# Patient Record
Sex: FEMALE | URBAN - METROPOLITAN AREA
[De-identification: names, ages, dates, MRNs, and addresses within clinical notes are randomized per-mention and may not be internally consistent; named-entity substitution may affect disease eponyms.]

---

## 2022-06-15 VITALS
OXYGEN SATURATION: 100 % | SYSTOLIC BLOOD PRESSURE: 154 MMHG | WEIGHT: 221.12 LBS | RESPIRATION RATE: 18 BRPM | HEART RATE: 91 BPM | HEIGHT: 64 IN | DIASTOLIC BLOOD PRESSURE: 87 MMHG | TEMPERATURE: 97 F

## 2022-06-15 RX ORDER — HYOSCYAMINE SULFATE 0.13 MG
0 TABLET ORAL
Qty: 0 | Refills: 0 | DISCHARGE

## 2022-06-15 NOTE — PRE-OP CHECKLIST - ALLERGIES REVIEWED
PT reports having effects from a pesticide for bed bugs. PT reports being around the pesticide for one month. PT c/o headache, eyes swelling with watering, nasal congestion. PT has been on a steroid for 9 days of an 18 day course.  PT reports improvement in done

## 2022-06-15 NOTE — PATIENT PROFILE ADULT - FALL HARM RISK - HARM RISK INTERVENTIONS

## 2022-06-15 NOTE — PRE-OP CHECKLIST - TEMPERATURE IN FAHRENHEIT (DEGREES F)
Problem: Anemia (Adult)  Goal: Identify Related Risk Factors and Signs and Symptoms  Related risk factors and signs and symptoms are identified upon initiation of Human Response Clinical Practice Guideline (CPG)  Outcome: Ongoing (interventions implemented as appropriate)  Plan of care reviewed with patient. Verbalized understanding. No distress noted. 2 units RBC transfused during shift. Will continue to monitor. On telemetry, no red alarms or ectopy AFIB, HR 70s-90s, will continue to monitor.Bed alarm active, bed in lowest position, call light within reach. Patient instructed to use call light for assistance. Verbalized understanding. Bed rails up x2.       96.9

## 2022-06-16 ENCOUNTER — INPATIENT (INPATIENT)
Facility: HOSPITAL | Age: 67
LOS: 1 days | Discharge: HOME | DRG: 621 | End: 2022-06-18
Attending: SURGERY | Admitting: SURGERY
Payer: MEDICARE

## 2022-06-16 DIAGNOSIS — Z98.890 OTHER SPECIFIED POSTPROCEDURAL STATES: Chronic | ICD-10-CM

## 2022-06-16 LAB
BLD GP AB SCN SERPL QL: NEGATIVE — SIGNIFICANT CHANGE UP
HCT VFR BLD CALC: 40.6 % — SIGNIFICANT CHANGE UP (ref 34.5–45)
HGB BLD-MCNC: 13.1 G/DL — SIGNIFICANT CHANGE UP (ref 11.5–15.5)
MCHC RBC-ENTMCNC: 27.5 PG — SIGNIFICANT CHANGE UP (ref 27–34)
MCHC RBC-ENTMCNC: 32.3 GM/DL — SIGNIFICANT CHANGE UP (ref 32–36)
MCV RBC AUTO: 85.3 FL — SIGNIFICANT CHANGE UP (ref 80–100)
NRBC # BLD: 0 /100 WBCS — SIGNIFICANT CHANGE UP (ref 0–0)
PLATELET # BLD AUTO: 205 K/UL — SIGNIFICANT CHANGE UP (ref 150–400)
RBC # BLD: 4.76 M/UL — SIGNIFICANT CHANGE UP (ref 3.8–5.2)
RBC # FLD: 12.8 % — SIGNIFICANT CHANGE UP (ref 10.3–14.5)
RH IG SCN BLD-IMP: POSITIVE — SIGNIFICANT CHANGE UP
WBC # BLD: 14.39 K/UL — HIGH (ref 3.8–10.5)
WBC # FLD AUTO: 14.39 K/UL — HIGH (ref 3.8–10.5)

## 2022-06-16 PROCEDURE — 88307 TISSUE EXAM BY PATHOLOGIST: CPT | Mod: 26

## 2022-06-16 DEVICE — CLIP APPLIER ETHICON LIGAMAX 5MM: Type: IMPLANTABLE DEVICE | Status: FUNCTIONAL

## 2022-06-16 DEVICE — TISSEEL 4ML: Type: IMPLANTABLE DEVICE | Status: FUNCTIONAL

## 2022-06-16 DEVICE — STAPLE SEAMGUARD 60 UNI STRT ROTIC GRN: Type: IMPLANTABLE DEVICE | Status: FUNCTIONAL

## 2022-06-16 DEVICE — STAPLER COVIDIEN TRI-STAPLE 60MM BLACK RELOAD: Type: IMPLANTABLE DEVICE | Status: FUNCTIONAL

## 2022-06-16 RX ORDER — ATORVASTATIN CALCIUM 80 MG/1
20 TABLET, FILM COATED ORAL AT BEDTIME
Refills: 0 | Status: DISCONTINUED | OUTPATIENT
Start: 2022-06-16 | End: 2022-06-18

## 2022-06-16 RX ORDER — SCOPALAMINE 1 MG/3D
1 PATCH, EXTENDED RELEASE TRANSDERMAL ONCE
Refills: 0 | Status: COMPLETED | OUTPATIENT
Start: 2022-06-16 | End: 2022-06-16

## 2022-06-16 RX ORDER — ALBUTEROL 90 UG/1
2 AEROSOL, METERED ORAL EVERY 6 HOURS
Refills: 0 | Status: DISCONTINUED | OUTPATIENT
Start: 2022-06-16 | End: 2022-06-18

## 2022-06-16 RX ORDER — HYDRALAZINE HCL 50 MG
10 TABLET ORAL ONCE
Refills: 0 | Status: COMPLETED | OUTPATIENT
Start: 2022-06-16 | End: 2022-06-16

## 2022-06-16 RX ORDER — VERAPAMIL HCL 240 MG
1 CAPSULE, EXTENDED RELEASE PELLETS 24 HR ORAL
Qty: 0 | Refills: 0 | DISCHARGE

## 2022-06-16 RX ORDER — ATORVASTATIN CALCIUM 80 MG/1
1 TABLET, FILM COATED ORAL
Qty: 0 | Refills: 0 | DISCHARGE

## 2022-06-16 RX ORDER — SODIUM CHLORIDE 9 MG/ML
1000 INJECTION, SOLUTION INTRAVENOUS
Refills: 0 | Status: DISCONTINUED | OUTPATIENT
Start: 2022-06-16 | End: 2022-06-18

## 2022-06-16 RX ORDER — ENOXAPARIN SODIUM 100 MG/ML
40 INJECTION SUBCUTANEOUS ONCE
Refills: 0 | Status: COMPLETED | OUTPATIENT
Start: 2022-06-16 | End: 2022-06-16

## 2022-06-16 RX ORDER — ACETAMINOPHEN 500 MG
650 TABLET ORAL EVERY 6 HOURS
Refills: 0 | Status: DISCONTINUED | OUTPATIENT
Start: 2022-06-16 | End: 2022-06-18

## 2022-06-16 RX ORDER — PANTOPRAZOLE SODIUM 20 MG/1
40 TABLET, DELAYED RELEASE ORAL DAILY
Refills: 0 | Status: DISCONTINUED | OUTPATIENT
Start: 2022-06-16 | End: 2022-06-18

## 2022-06-16 RX ORDER — ONDANSETRON 8 MG/1
4 TABLET, FILM COATED ORAL EVERY 6 HOURS
Refills: 0 | Status: DISCONTINUED | OUTPATIENT
Start: 2022-06-16 | End: 2022-06-18

## 2022-06-16 RX ORDER — ACETAMINOPHEN 500 MG
1000 TABLET ORAL ONCE
Refills: 0 | Status: COMPLETED | OUTPATIENT
Start: 2022-06-16 | End: 2022-06-16

## 2022-06-16 RX ORDER — LOPERAMIDE HCL 2 MG
1 TABLET ORAL
Qty: 0 | Refills: 0 | DISCHARGE

## 2022-06-16 RX ORDER — HYOSCYAMINE SULFATE 0.13 MG
0.12 TABLET ORAL EVERY 6 HOURS
Refills: 0 | Status: DISCONTINUED | OUTPATIENT
Start: 2022-06-16 | End: 2022-06-18

## 2022-06-16 RX ORDER — BENRALIZUMAB 30 MG/ML
0 INJECTION, SOLUTION SUBCUTANEOUS
Qty: 0 | Refills: 0 | DISCHARGE

## 2022-06-16 RX ORDER — KETOROLAC TROMETHAMINE 30 MG/ML
15 SYRINGE (ML) INJECTION ONCE
Refills: 0 | Status: DISCONTINUED | OUTPATIENT
Start: 2022-06-16 | End: 2022-06-16

## 2022-06-16 RX ORDER — ALBUTEROL 90 UG/1
2 AEROSOL, METERED ORAL
Qty: 0 | Refills: 0 | DISCHARGE

## 2022-06-16 RX ADMIN — ENOXAPARIN SODIUM 40 MILLIGRAM(S): 100 INJECTION SUBCUTANEOUS at 08:49

## 2022-06-16 RX ADMIN — Medication 1000 MILLIGRAM(S): at 16:58

## 2022-06-16 RX ADMIN — Medication 650 MILLIGRAM(S): at 23:05

## 2022-06-16 RX ADMIN — SCOPALAMINE 1 PATCH: 1 PATCH, EXTENDED RELEASE TRANSDERMAL at 19:57

## 2022-06-16 RX ADMIN — Medication 15 MILLIGRAM(S): at 21:20

## 2022-06-16 RX ADMIN — SCOPALAMINE 1 PATCH: 1 PATCH, EXTENDED RELEASE TRANSDERMAL at 08:50

## 2022-06-16 RX ADMIN — ATORVASTATIN CALCIUM 20 MILLIGRAM(S): 80 TABLET, FILM COATED ORAL at 23:15

## 2022-06-16 RX ADMIN — ONDANSETRON 4 MILLIGRAM(S): 8 TABLET, FILM COATED ORAL at 16:40

## 2022-06-16 RX ADMIN — Medication 0.12 MILLIGRAM(S): at 23:06

## 2022-06-16 RX ADMIN — Medication 400 MILLIGRAM(S): at 16:43

## 2022-06-16 RX ADMIN — Medication 15 MILLIGRAM(S): at 21:00

## 2022-06-16 RX ADMIN — PANTOPRAZOLE SODIUM 40 MILLIGRAM(S): 20 TABLET, DELAYED RELEASE ORAL at 16:20

## 2022-06-16 RX ADMIN — Medication 1000 MILLIGRAM(S): at 08:48

## 2022-06-16 RX ADMIN — ONDANSETRON 4 MILLIGRAM(S): 8 TABLET, FILM COATED ORAL at 23:05

## 2022-06-16 NOTE — H&P ADULT - ASSESSMENT
67 y/o F pmh MO ( BMI 38), HEATHER, HTN, HLD, GERD, IBS Asthma, presents for Laparoscopic Sleeve Gastrectomy.     Pre Op Consent  Tylenol/Scopolamine  Lovenox 40  Type and Screen  Additional Recommendation Pending Procedure

## 2022-06-16 NOTE — H&P ADULT - NSICDXPASTMEDICALHX_GEN_ALL_CORE_FT
PAST MEDICAL HISTORY:  Asthma     GERD (gastroesophageal reflux disease)     HLD (hyperlipidemia)     HTN (hypertension)     IBS (irritable bowel syndrome)     Obesity     HEATHER (obstructive sleep apnea) no CPAP    Prediabetes

## 2022-06-16 NOTE — H&P ADULT - NSICDXPASTSURGICALHX_GEN_ALL_CORE_FT
PAST SURGICAL HISTORY:  H/O breast biopsy b/l    H/O knee surgery right    H/O myomectomy     H/O sinus surgery

## 2022-06-16 NOTE — PROGRESS NOTE ADULT - SUBJECTIVE AND OBJECTIVE BOX
Team 2 Surgery Post-Op Note, PCN:     Pre-Op Dx: morbid obesity  Procedure: Laparoscopic sleeve gastrectomy      Surgeon: Kapil    Subjective: pt seen at bedside, complaining of nausea-per pt just received zofran IV recently. complains of abdominal pain, ordered IV tylenol      Vital Signs Last 24 Hrs  T(C): 36.5 (16 Jun 2022 14:42), Max: 36.5 (16 Jun 2022 14:42)  T(F): 97.7 (16 Jun 2022 14:42), Max: 97.7 (16 Jun 2022 14:42)  HR: 70 (16 Jun 2022 16:12) (66 - 73)  BP: 152/72 (16 Jun 2022 16:12) (137/85 - 158/86)  BP(mean): 103 (16 Jun 2022 16:12) (103 - 113)  RR: 19 (16 Jun 2022 16:12) (14 - 19)  SpO2: 100% (16 Jun 2022 16:12) (99% - 100%)    Physical Exam:  General: NAD, resting comfortably in bed  Pulmonary: Nonlabored breathing, no respiratory distress  Cardiovascular: NSR  Abdominal: soft, nondistended, appropriate incisional tenderness, incisions c/d/i  Extremities: WWP, normal strength  Neuro: A/O x 3, no focal deficits, normal sensation  Pulses: palpable distal pulses      LABS:            CAPILLARY BLOOD GLUCOSE            ABO Interpretation: O (06-16 @ 12:56)        Radiology and Additional Studies:    Assessment:66y Female s/p above procedure    Plan:  Pain/nausea control PRN  Home meds  Incentive spirometer/OOB/Ambulate  IVF, Diet: BCKD  AM labs  ToV

## 2022-06-16 NOTE — BRIEF OPERATIVE NOTE - OPERATION/FINDINGS
Procedure: Laparoscopic Sleeve Gastrectomy and Hiatal Hernia Repair    Acces via optical entry. Liver retracted. Hiatal hernia noted and repaired w/ interrupted suture x2 placed posteriorly. Posterior attachment of stomach divided and sleeved over 38 Fr. Bougie w/ Black loads x4. Vessels at greater curviture divided. Portion of stomach removed. Clips placed along staple line. Hemostasis ensured. 12mm fascia closed w/ vicryl, skin closed w/ monocryl

## 2022-06-16 NOTE — PACU DISCHARGE NOTE - COMMENTS
s/p gastric sleeve, hiatal hernia repair. Lap sites x4 CDI. Patient c/o chest pain during early stay of PACU. No cardiac labs ordered, team aware. Patient VSS during pain. Verbalized relief of some chest pain after coughing up some mucus and belching. Voided total 75 cc during stay. c/o of nausea x1, relieved with zofran 4mg IVP.

## 2022-06-17 LAB
ANION GAP SERPL CALC-SCNC: 14 MMOL/L — SIGNIFICANT CHANGE UP (ref 5–17)
BUN SERPL-MCNC: 8 MG/DL — SIGNIFICANT CHANGE UP (ref 7–23)
CALCIUM SERPL-MCNC: 9.1 MG/DL — SIGNIFICANT CHANGE UP (ref 8.4–10.5)
CHLORIDE SERPL-SCNC: 100 MMOL/L — SIGNIFICANT CHANGE UP (ref 96–108)
CO2 SERPL-SCNC: 22 MMOL/L — SIGNIFICANT CHANGE UP (ref 22–31)
CREAT SERPL-MCNC: 0.77 MG/DL — SIGNIFICANT CHANGE UP (ref 0.5–1.3)
EGFR: 86 ML/MIN/1.73M2 — SIGNIFICANT CHANGE UP
GLUCOSE SERPL-MCNC: 133 MG/DL — HIGH (ref 70–99)
HCT VFR BLD CALC: 38.7 % — SIGNIFICANT CHANGE UP (ref 34.5–45)
HCV AB S/CO SERPL IA: 0.04 S/CO — SIGNIFICANT CHANGE UP
HCV AB SERPL-IMP: SIGNIFICANT CHANGE UP
HGB BLD-MCNC: 12.6 G/DL — SIGNIFICANT CHANGE UP (ref 11.5–15.5)
MAGNESIUM SERPL-MCNC: 1.7 MG/DL — SIGNIFICANT CHANGE UP (ref 1.6–2.6)
MCHC RBC-ENTMCNC: 26.8 PG — LOW (ref 27–34)
MCHC RBC-ENTMCNC: 32.6 GM/DL — SIGNIFICANT CHANGE UP (ref 32–36)
MCV RBC AUTO: 82.3 FL — SIGNIFICANT CHANGE UP (ref 80–100)
NRBC # BLD: 0 /100 WBCS — SIGNIFICANT CHANGE UP (ref 0–0)
PHOSPHATE SERPL-MCNC: 2.6 MG/DL — SIGNIFICANT CHANGE UP (ref 2.5–4.5)
PLATELET # BLD AUTO: 204 K/UL — SIGNIFICANT CHANGE UP (ref 150–400)
POTASSIUM SERPL-MCNC: 3.9 MMOL/L — SIGNIFICANT CHANGE UP (ref 3.5–5.3)
POTASSIUM SERPL-SCNC: 3.9 MMOL/L — SIGNIFICANT CHANGE UP (ref 3.5–5.3)
RBC # BLD: 4.7 M/UL — SIGNIFICANT CHANGE UP (ref 3.8–5.2)
RBC # FLD: 13 % — SIGNIFICANT CHANGE UP (ref 10.3–14.5)
SODIUM SERPL-SCNC: 136 MMOL/L — SIGNIFICANT CHANGE UP (ref 135–145)
WBC # BLD: 11.7 K/UL — HIGH (ref 3.8–10.5)
WBC # FLD AUTO: 11.7 K/UL — HIGH (ref 3.8–10.5)

## 2022-06-17 RX ORDER — DEXAMETHASONE 0.5 MG/5ML
6 ELIXIR ORAL EVERY 8 HOURS
Refills: 0 | Status: DISCONTINUED | OUTPATIENT
Start: 2022-06-18 | End: 2022-06-18

## 2022-06-17 RX ORDER — ONDANSETRON 8 MG/1
1 TABLET, FILM COATED ORAL
Qty: 40 | Refills: 0
Start: 2022-06-17 | End: 2022-06-26

## 2022-06-17 RX ORDER — OMEPRAZOLE 10 MG/1
1 CAPSULE, DELAYED RELEASE ORAL
Qty: 30 | Refills: 0
Start: 2022-06-17 | End: 2022-07-16

## 2022-06-17 RX ORDER — ASPIRIN/CALCIUM CARB/MAGNESIUM 324 MG
1 TABLET ORAL
Qty: 30 | Refills: 0
Start: 2022-06-17 | End: 2022-07-16

## 2022-06-17 RX ORDER — ACETAMINOPHEN 500 MG
20.31 TABLET ORAL
Qty: 0 | Refills: 0 | DISCHARGE
Start: 2022-06-17

## 2022-06-17 RX ORDER — ACETAMINOPHEN 500 MG
20 TABLET ORAL
Qty: 400 | Refills: 0
Start: 2022-06-17 | End: 2022-06-21

## 2022-06-17 RX ORDER — POTASSIUM PHOSPHATE, MONOBASIC POTASSIUM PHOSPHATE, DIBASIC 236; 224 MG/ML; MG/ML
21 INJECTION, SOLUTION INTRAVENOUS ONCE
Refills: 0 | Status: COMPLETED | OUTPATIENT
Start: 2022-06-17 | End: 2022-06-17

## 2022-06-17 RX ORDER — DEXAMETHASONE 0.5 MG/5ML
10 ELIXIR ORAL ONCE
Refills: 0 | Status: DISCONTINUED | OUTPATIENT
Start: 2022-06-17 | End: 2022-06-17

## 2022-06-17 RX ORDER — MAGNESIUM SULFATE 500 MG/ML
1 VIAL (ML) INJECTION ONCE
Refills: 0 | Status: COMPLETED | OUTPATIENT
Start: 2022-06-17 | End: 2022-06-17

## 2022-06-17 RX ORDER — BENZOCAINE AND MENTHOL 5; 1 G/100ML; G/100ML
1 LIQUID ORAL
Refills: 0 | Status: DISCONTINUED | OUTPATIENT
Start: 2022-06-17 | End: 2022-06-18

## 2022-06-17 RX ORDER — ONDANSETRON 8 MG/1
5 TABLET, FILM COATED ORAL
Qty: 200 | Refills: 0
Start: 2022-06-17 | End: 2022-06-26

## 2022-06-17 RX ORDER — HYDRALAZINE HCL 50 MG
10 TABLET ORAL ONCE
Refills: 0 | Status: COMPLETED | OUTPATIENT
Start: 2022-06-17 | End: 2022-06-17

## 2022-06-17 RX ORDER — KETOROLAC TROMETHAMINE 30 MG/ML
15 SYRINGE (ML) INJECTION EVERY 6 HOURS
Refills: 0 | Status: DISCONTINUED | OUTPATIENT
Start: 2022-06-17 | End: 2022-06-18

## 2022-06-17 RX ORDER — LANSOPRAZOLE 15 MG/1
20 CAPSULE, DELAYED RELEASE ORAL
Qty: 0 | Refills: 0 | DISCHARGE

## 2022-06-17 RX ORDER — DEXAMETHASONE 0.5 MG/5ML
10 ELIXIR ORAL ONCE
Refills: 0 | Status: COMPLETED | OUTPATIENT
Start: 2022-06-17 | End: 2022-06-17

## 2022-06-17 RX ORDER — HYOSCYAMINE SULFATE 0.13 MG
2 TABLET ORAL
Qty: 0 | Refills: 0 | DISCHARGE

## 2022-06-17 RX ADMIN — Medication 650 MILLIGRAM(S): at 13:13

## 2022-06-17 RX ADMIN — SODIUM CHLORIDE 140 MILLILITER(S): 9 INJECTION, SOLUTION INTRAVENOUS at 18:54

## 2022-06-17 RX ADMIN — Medication 650 MILLIGRAM(S): at 13:45

## 2022-06-17 RX ADMIN — PANTOPRAZOLE SODIUM 40 MILLIGRAM(S): 20 TABLET, DELAYED RELEASE ORAL at 13:14

## 2022-06-17 RX ADMIN — Medication 15 MILLIGRAM(S): at 05:22

## 2022-06-17 RX ADMIN — POTASSIUM PHOSPHATE, MONOBASIC POTASSIUM PHOSPHATE, DIBASIC 62.5 MILLIMOLE(S): 236; 224 INJECTION, SOLUTION INTRAVENOUS at 11:46

## 2022-06-17 RX ADMIN — Medication 15 MILLIGRAM(S): at 05:02

## 2022-06-17 RX ADMIN — SCOPALAMINE 1 PATCH: 1 PATCH, EXTENDED RELEASE TRANSDERMAL at 06:11

## 2022-06-17 RX ADMIN — Medication 0.12 MILLIGRAM(S): at 19:56

## 2022-06-17 RX ADMIN — Medication 100 GRAM(S): at 11:46

## 2022-06-17 RX ADMIN — Medication 102 MILLIGRAM(S): at 19:56

## 2022-06-17 RX ADMIN — Medication 650 MILLIGRAM(S): at 06:22

## 2022-06-17 RX ADMIN — ONDANSETRON 4 MILLIGRAM(S): 8 TABLET, FILM COATED ORAL at 05:02

## 2022-06-17 RX ADMIN — Medication 0.12 MILLIGRAM(S): at 12:28

## 2022-06-17 RX ADMIN — Medication 650 MILLIGRAM(S): at 18:54

## 2022-06-17 RX ADMIN — ONDANSETRON 4 MILLIGRAM(S): 8 TABLET, FILM COATED ORAL at 22:05

## 2022-06-17 RX ADMIN — Medication 10 MILLIGRAM(S): at 06:22

## 2022-06-17 RX ADMIN — Medication 650 MILLIGRAM(S): at 00:05

## 2022-06-17 RX ADMIN — Medication 15 MILLIGRAM(S): at 22:25

## 2022-06-17 RX ADMIN — Medication 650 MILLIGRAM(S): at 07:22

## 2022-06-17 RX ADMIN — Medication 10 MILLIGRAM(S): at 00:01

## 2022-06-17 RX ADMIN — SODIUM CHLORIDE 140 MILLILITER(S): 9 INJECTION, SOLUTION INTRAVENOUS at 05:02

## 2022-06-17 RX ADMIN — Medication 0.12 MILLIGRAM(S): at 06:23

## 2022-06-17 RX ADMIN — Medication 15 MILLIGRAM(S): at 22:05

## 2022-06-17 RX ADMIN — ATORVASTATIN CALCIUM 20 MILLIGRAM(S): 80 TABLET, FILM COATED ORAL at 22:06

## 2022-06-17 NOTE — DIETITIAN INITIAL EVALUATION ADULT - PERTINENT MEDS FT
MEDICATIONS  (STANDING):  acetaminophen    Suspension .. 650 milliGRAM(s) Oral every 6 hours  atorvastatin 20 milliGRAM(s) Oral at bedtime  hyoscyamine SL 0.125 milliGRAM(s) SubLingual every 6 hours  lactated ringers. 1000 milliLiter(s) (140 mL/Hr) IV Continuous <Continuous>  pantoprazole  Injectable 40 milliGRAM(s) IV Push daily    MEDICATIONS  (PRN):  ALBUTerol    90 MICROgram(s) HFA Inhaler 2 Puff(s) Inhalation every 6 hours PRN Shortness of Breath and/or Wheezing  benzocaine 15 mG/menthol 3.6 mG Lozenge 1 Lozenge Oral four times a day PRN Sore Throat  ketorolac   Injectable 15 milliGRAM(s) IV Push every 6 hours PRN Moderate Pain (4 - 6)  ondansetron Injectable 4 milliGRAM(s) IV Push every 6 hours PRN Nausea and/or Vomiting

## 2022-06-17 NOTE — DISCHARGE NOTE PROVIDER - CARE PROVIDER_API CALL
Adrianna Dick  SURGERY  [facility] [address] [place] [address], [address]  [place] [address]  Phone: [phone]  Fax: [phone]  Follow Up Time: 2 weeks

## 2022-06-17 NOTE — PROGRESS NOTE ADULT - SUBJECTIVE AND OBJECTIVE BOX
SUBJECTIVE: Endorses throat pain with dark red phlegm since the surgery. Denies n/v. Endorses f/bm. Denies cp/sob. Pain is well controlled. Ambulating as tolerated.      MEDICATIONS  (STANDING):  acetaminophen    Suspension .. 650 milliGRAM(s) Oral every 6 hours  atorvastatin 20 milliGRAM(s) Oral at bedtime  hyoscyamine SL 0.125 milliGRAM(s) SubLingual every 6 hours  lactated ringers. 1000 milliLiter(s) (140 mL/Hr) IV Continuous <Continuous>  pantoprazole  Injectable 40 milliGRAM(s) IV Push daily    MEDICATIONS  (PRN):  ALBUTerol    90 MICROgram(s) HFA Inhaler 2 Puff(s) Inhalation every 6 hours PRN Shortness of Breath and/or Wheezing  ketorolac   Injectable 15 milliGRAM(s) IV Push every 6 hours PRN Moderate Pain (4 - 6)  ondansetron Injectable 4 milliGRAM(s) IV Push every 6 hours PRN Nausea and/or Vomiting      Vital Signs Last 24 Hrs  T(C): 36.9 (17 Jun 2022 05:10), Max: 37.1 (16 Jun 2022 23:25)  T(F): 98.4 (17 Jun 2022 05:10), Max: 98.8 (16 Jun 2022 23:25)  HR: 71 (17 Jun 2022 05:10) (66 - 83)  BP: 174/72 (17 Jun 2022 05:10) (132/85 - 178/98)  BP(mean): 103 (16 Jun 2022 17:42) (103 - 113)  RR: 17 (17 Jun 2022 05:10) (14 - 25)  SpO2: 97% (17 Jun 2022 05:10) (96% - 100%)    Physical Exam:  General: NAD, resting comfortably in bed  HEENT: MMM, no abrasions noted in the oropharynx  Pulmonary: Nonlabored breathing, no respiratory distress  Cardiovascular: NSR  Abdominal: soft, NT/ND, incisions clean/dry/intact   Extremities: WWP, normal strength  Neuro: A/O x 3, CNs II-XII grossly intact, no focal deficits    I&O's Summary    16 Jun 2022 07:01  -  17 Jun 2022 07:00  --------------------------------------------------------  IN: 840 mL / OUT: 575 mL / NET: 265 mL        LABS:                        13.1   14.39 )-----------( 205      ( 16 Jun 2022 20:26 )             40.6

## 2022-06-17 NOTE — DIETITIAN INITIAL EVALUATION ADULT - OTHER CALCULATIONS
lbs. %. IBW used to calculate energy needs due to pt's current body weight exceeding 120% of IBW. Above energy needs calculated for wt maintenance (20-25kcal/kg).   Weeks 1-2 estimated needs: kcal/day (10-12kcal/kg), g pro/day (1.5-2.1g/kg), >/=64oz clear fluids.

## 2022-06-17 NOTE — DISCHARGE NOTE PROVIDER - NSDCMRMEDTOKEN_GEN_ALL_CORE_FT
acetaminophen 160 mg/5 mL oral suspension: 20.31 milliliter(s) orally every 6 hours, As Needed  Albuterol (Eqv-ProAir HFA) 90 mcg/inh inhalation aerosol: 2 puff(s) inhaled every 6 hours  atorvastatin 20 mg oral tablet: 1 tab(s) orally once a day  Fasenra Pen 30 mg/mL subcutaneous solution:   Imodium 2 mg oral capsule: 1 cap(s) orally every 4 hours, As Needed  Levsin 0.125 mg oral tablet: 2 tab(s) orally , As Needed  omeprazole 40 mg oral delayed release capsule: 1 cap(s) orally once a day   verapamil 180 mg/24 hours oral capsule, extended release: 1 cap(s) orally once a day   acetaminophen 160 mg/5 mL oral liquid: 20 milliliter(s) orally every 6 hours   Albuterol (Eqv-ProAir HFA) 90 mcg/inh inhalation aerosol: 2 puff(s) inhaled every 6 hours  aspirin 81 mg oral tablet, chewable: 1 tab(s) chewed once a day   atorvastatin 20 mg oral tablet: 1 tab(s) orally once a day  Fasenra Pen 30 mg/mL subcutaneous solution:   Imodium 2 mg oral capsule: 1 cap(s) orally every 4 hours, As Needed  Levsin 0.125 mg oral tablet: 2 tab(s) orally 4 times a day, As Needed  omeprazole 40 mg oral delayed release capsule: 1 cap(s) orally once a day   ondansetron 4 mg oral tablet, disintegratin tab(s) orally every 6 hours   verapamil 180 mg/24 hours oral capsule, extended release: 1 cap(s) orally once a day

## 2022-06-17 NOTE — PROVIDER CONTACT NOTE (OTHER) - ASSESSMENT
/95 HR 76 pt c/o nausea. no c/o dizziness, headache, CP or SOB
/98 HR 78, pt asymptomatic no c/o dizziness, headache, CP or SOB

## 2022-06-17 NOTE — DIETITIAN INITIAL EVALUATION ADULT - PERTINENT LABORATORY DATA
06-17    136  |  100  |  8   ----------------------------<  133<H>  3.9   |  22  |  0.77    Ca    9.1      17 Jun 2022 09:01  Phos  2.6     06-17  Mg     1.7     06-17

## 2022-06-17 NOTE — DISCHARGE NOTE PROVIDER - HOSPITAL COURSE
65 y/o F pmh MO ( BMI 38), HEATHER, HTN, HLD, GERD, IBS, Asthma presented to [facility] for laparoscopic sleeve gastrectomy. The operation was uncomplicated and post-operative course was uneventful. Post-op pain and nausea were controlled. The hemoglobin level was stable post-op thus anticoagulation was re-started per protocol. Patient's diet has been advanced to bariatric clear liquids, which has been tolerated without nausea/vomiting. Patient is ambulating without difficulty and voiding spontaneously with bowel function. Pain is well controlled on oral pain medication. This patient is deemed ready for discharge and will follow up with the surgeon in the outpatient setting.

## 2022-06-17 NOTE — PROGRESS NOTE ADULT - ASSESSMENT
67 y/o F pmh MO ( BMI 38), HEATHER, HTN, HLD, GERD, IBS, Asthma, presents for Laparoscopic Sleeve Gastrectomy.     -Pain/nausea control   -BCLD, IVF   -Protonix   -SCDs, OOB/A, IS   -AM labs

## 2022-06-17 NOTE — DISCHARGE NOTE PROVIDER - NSDCFUADDINST_GEN_ALL_CORE_FT
Follow up with your surgeon in the office in 2 weeks. Please call office to make appointment [phone].    Activity as tolerated. No strenuous activity/exercise or heavy lifting > 20lbs.    Diet: Bariatric full liquids on discharge. Must intake 60 grams protein daily and 64 fluid ounces daily. Please follow instructions as per booklet given in the office regarding post-op diet regimen.    Take a baby aspirin 81mg daily for one month following surgery.  No NSAIDs (Advil, motrin, aleve, ibuprofen) until okay'd by your surgeon.     Please call the office or go to ER if you begin experiencing increase abdominal pain, nausea, vomiting, temperature > 100.5F. Follow up with your surgeon in the office in 2 weeks. Please call office to make appointment [phone].    Activity as tolerated. No strenuous activity/exercise or heavy lifting > 20lbs.    Diet: Bariatric full liquids on discharge. Must intake 60 grams protein daily and 64 fluid ounces daily. Please follow instructions as per booklet given in the office regarding post-op diet regimen.    Take a baby aspirin 81mg daily for one month following surgery.  No NSAIDs (Advil, motrin, aleve, ibuprofen) until okay'd by your surgeon.     Please call the office or go to ER if you begin experiencing increase abdominal pain, nausea, vomiting, temperature > 100.5F.    1) Please take levsin/hyoscyamine 0.125 mg sublingual 4 times a day  2) Please take ASA 81mg chewable once a day for 30 days  3) Please take zofran 4mg every 6 hours as needed for nausea/emesis  4) Please take Tylenol 650 mg every 4 to 6 hours by mouth for moderate pain control. Please do not exceed over 4,000 mg of Tylenol a day.

## 2022-06-17 NOTE — DIETITIAN INITIAL EVALUATION ADULT - OTHER INFO
67 y/o F pmh MO ( BMI 38), HEATHER, HTN, HLD, GERD, IBS, Asthma, presents for Laparoscopic Sleeve Gastrectomy. Pt is now s/p surgery on 6/14.    Currently on BARICLLIQ diet. Reports few sips of water, ~1/2 cup. Pt report unable to keep liquids down, states will spit up when sipping liquids. Will continue to attempt to aim for recommended liquid amount. Pain and nausea being controlled at this time. Discussed volumes of various cup sizes on tray table and encouraged aiming for 4 oz/hr as tolerated. Prepared with protein shakes, with plan to get vitamins after discharge. RD provided indepth edu on diet advancement and specific nutrient needs s/p LSG. Reports allergy to shellfish. No dietary restrictions at home. Skin: Eric 19, surgical incisions. GI: WDL per flowsheet. RD to follow up per protocol.

## 2022-06-18 VITALS
DIASTOLIC BLOOD PRESSURE: 87 MMHG | TEMPERATURE: 98 F | RESPIRATION RATE: 20 BRPM | SYSTOLIC BLOOD PRESSURE: 146 MMHG | HEART RATE: 75 BPM | OXYGEN SATURATION: 97 %

## 2022-06-18 LAB
ANION GAP SERPL CALC-SCNC: 12 MMOL/L — SIGNIFICANT CHANGE UP (ref 5–17)
BUN SERPL-MCNC: 10 MG/DL — SIGNIFICANT CHANGE UP (ref 7–23)
CALCIUM SERPL-MCNC: 9.6 MG/DL — SIGNIFICANT CHANGE UP (ref 8.4–10.5)
CHLORIDE SERPL-SCNC: 103 MMOL/L — SIGNIFICANT CHANGE UP (ref 96–108)
CO2 SERPL-SCNC: 23 MMOL/L — SIGNIFICANT CHANGE UP (ref 22–31)
CREAT SERPL-MCNC: 0.91 MG/DL — SIGNIFICANT CHANGE UP (ref 0.5–1.3)
EGFR: 70 ML/MIN/1.73M2 — SIGNIFICANT CHANGE UP
GLUCOSE SERPL-MCNC: 147 MG/DL — HIGH (ref 70–99)
HCT VFR BLD CALC: 38.1 % — SIGNIFICANT CHANGE UP (ref 34.5–45)
HGB BLD-MCNC: 12.2 G/DL — SIGNIFICANT CHANGE UP (ref 11.5–15.5)
MAGNESIUM SERPL-MCNC: 2.2 MG/DL — SIGNIFICANT CHANGE UP (ref 1.6–2.6)
MCHC RBC-ENTMCNC: 26.6 PG — LOW (ref 27–34)
MCHC RBC-ENTMCNC: 32 GM/DL — SIGNIFICANT CHANGE UP (ref 32–36)
MCV RBC AUTO: 83 FL — SIGNIFICANT CHANGE UP (ref 80–100)
NRBC # BLD: 0 /100 WBCS — SIGNIFICANT CHANGE UP (ref 0–0)
PHOSPHATE SERPL-MCNC: 3.3 MG/DL — SIGNIFICANT CHANGE UP (ref 2.5–4.5)
PLATELET # BLD AUTO: 211 K/UL — SIGNIFICANT CHANGE UP (ref 150–400)
POTASSIUM SERPL-MCNC: 4.8 MMOL/L — SIGNIFICANT CHANGE UP (ref 3.5–5.3)
POTASSIUM SERPL-SCNC: 4.8 MMOL/L — SIGNIFICANT CHANGE UP (ref 3.5–5.3)
RBC # BLD: 4.59 M/UL — SIGNIFICANT CHANGE UP (ref 3.8–5.2)
RBC # FLD: 13.3 % — SIGNIFICANT CHANGE UP (ref 10.3–14.5)
SODIUM SERPL-SCNC: 138 MMOL/L — SIGNIFICANT CHANGE UP (ref 135–145)
WBC # BLD: 8.42 K/UL — SIGNIFICANT CHANGE UP (ref 3.8–10.5)
WBC # FLD AUTO: 8.42 K/UL — SIGNIFICANT CHANGE UP (ref 3.8–10.5)

## 2022-06-18 PROCEDURE — 86850 RBC ANTIBODY SCREEN: CPT

## 2022-06-18 PROCEDURE — C1889: CPT

## 2022-06-18 PROCEDURE — 83735 ASSAY OF MAGNESIUM: CPT

## 2022-06-18 PROCEDURE — 86803 HEPATITIS C AB TEST: CPT

## 2022-06-18 PROCEDURE — 85027 COMPLETE CBC AUTOMATED: CPT

## 2022-06-18 PROCEDURE — 88307 TISSUE EXAM BY PATHOLOGIST: CPT

## 2022-06-18 PROCEDURE — C1781: CPT

## 2022-06-18 PROCEDURE — 86900 BLOOD TYPING SEROLOGIC ABO: CPT

## 2022-06-18 PROCEDURE — 36415 COLL VENOUS BLD VENIPUNCTURE: CPT

## 2022-06-18 PROCEDURE — 86901 BLOOD TYPING SEROLOGIC RH(D): CPT

## 2022-06-18 PROCEDURE — 84100 ASSAY OF PHOSPHORUS: CPT

## 2022-06-18 PROCEDURE — 94660 CPAP INITIATION&MGMT: CPT

## 2022-06-18 PROCEDURE — 80048 BASIC METABOLIC PNL TOTAL CA: CPT

## 2022-06-18 RX ORDER — HYOSCYAMINE SULFATE 0.13 MG
1 TABLET ORAL
Qty: 56 | Refills: 0
Start: 2022-06-18 | End: 2022-07-01

## 2022-06-18 RX ORDER — HYOSCYAMINE SULFATE 0.13 MG
2 TABLET ORAL
Qty: 0 | Refills: 0 | DISCHARGE

## 2022-06-18 RX ORDER — HEPARIN SODIUM 5000 [USP'U]/ML
7500 INJECTION INTRAVENOUS; SUBCUTANEOUS EVERY 8 HOURS
Refills: 0 | Status: DISCONTINUED | OUTPATIENT
Start: 2022-06-18 | End: 2022-06-18

## 2022-06-18 RX ADMIN — Medication 650 MILLIGRAM(S): at 12:17

## 2022-06-18 RX ADMIN — HEPARIN SODIUM 7500 UNIT(S): 5000 INJECTION INTRAVENOUS; SUBCUTANEOUS at 14:44

## 2022-06-18 RX ADMIN — SODIUM CHLORIDE 140 MILLILITER(S): 9 INJECTION, SOLUTION INTRAVENOUS at 09:47

## 2022-06-18 RX ADMIN — Medication 6 MILLIGRAM(S): at 01:11

## 2022-06-18 RX ADMIN — PANTOPRAZOLE SODIUM 40 MILLIGRAM(S): 20 TABLET, DELAYED RELEASE ORAL at 12:17

## 2022-06-18 RX ADMIN — SCOPALAMINE 1 PATCH: 1 PATCH, EXTENDED RELEASE TRANSDERMAL at 06:39

## 2022-06-18 RX ADMIN — Medication 0.12 MILLIGRAM(S): at 12:17

## 2022-06-18 RX ADMIN — Medication 650 MILLIGRAM(S): at 06:40

## 2022-06-18 RX ADMIN — Medication 650 MILLIGRAM(S): at 01:10

## 2022-06-18 RX ADMIN — BENZOCAINE AND MENTHOL 1 LOZENGE: 5; 1 LIQUID ORAL at 09:46

## 2022-06-18 RX ADMIN — Medication 650 MILLIGRAM(S): at 12:47

## 2022-06-18 RX ADMIN — Medication 650 MILLIGRAM(S): at 07:40

## 2022-06-18 RX ADMIN — Medication 6 MILLIGRAM(S): at 06:42

## 2022-06-18 RX ADMIN — Medication 650 MILLIGRAM(S): at 02:10

## 2022-06-18 RX ADMIN — Medication 0.12 MILLIGRAM(S): at 07:22

## 2022-06-18 RX ADMIN — Medication 0.12 MILLIGRAM(S): at 01:20

## 2022-06-18 NOTE — DISCHARGE NOTE NURSING/CASE MANAGEMENT/SOCIAL WORK - PATIENT PORTAL LINK FT
You can access the FollowMyHealth Patient Portal offered by [facility] by registering at the following website: [email or web address]. By joining [facility]’s FollowMyHealth portal, you will also be able to view your health information using other applications (apps) compatible with our system.

## 2022-06-18 NOTE — PROGRESS NOTE ADULT - SUBJECTIVE AND OBJECTIVE BOX
STATUS POST:      POST OPERATIVE DAY #:     SUBJECTIVE: Pt seen and examined at bedside this am by surgery team. Tolerating diet, pain well controlled. Denies f/n/v/cp/sob.    MEDICATIONS  (STANDING):  acetaminophen    Suspension .. 650 milliGRAM(s) Oral every 6 hours  atorvastatin 20 milliGRAM(s) Oral at bedtime  dexAMETHasone  Injectable 6 milliGRAM(s) IV Push every 8 hours  hyoscyamine SL 0.125 milliGRAM(s) SubLingual every 6 hours  lactated ringers. 1000 milliLiter(s) (140 mL/Hr) IV Continuous <Continuous>  pantoprazole  Injectable 40 milliGRAM(s) IV Push daily    MEDICATIONS  (PRN):  ALBUTerol    90 MICROgram(s) HFA Inhaler 2 Puff(s) Inhalation every 6 hours PRN Shortness of Breath and/or Wheezing  benzocaine 15 mG/menthol 3.6 mG Lozenge 1 Lozenge Oral four times a day PRN Sore Throat  ketorolac   Injectable 15 milliGRAM(s) IV Push every 6 hours PRN Moderate Pain (4 - 6)  ondansetron Injectable 4 milliGRAM(s) IV Push every 6 hours PRN Nausea and/or Vomiting      Vital Signs Last 24 Hrs  T(C): 36.7 (18 Jun 2022 05:40), Max: 36.8 (18 Jun 2022 00:49)  T(F): 98.1 (18 Jun 2022 05:40), Max: 98.2 (18 Jun 2022 00:49)  HR: 62 (18 Jun 2022 05:40) (62 - 89)  BP: 172/99 (18 Jun 2022 05:40) (134/99 - 172/99)  BP(mean): --  RR: 18 (18 Jun 2022 05:40) (17 - 19)  SpO2: 97% (18 Jun 2022 05:40) (94% - 100%)    Physical Exam  General: NAD, resting comfortably in bed  Pulmonary: Nonlabored breathing, no respiratory distress  CV: NSR  Abd: soft, NT/ND, no guarding, incisions c/d/i  Extremities: (-) enema, warm, well-perfused      I&O's Detail    16 Jun 2022 07:01  -  17 Jun 2022 07:00  --------------------------------------------------------  IN:    Lactated Ringers: 840 mL  Total IN: 840 mL    OUT:    Voided (mL): 575 mL  Total OUT: 575 mL    Total NET: 265 mL      17 Jun 2022 07:01  -  18 Jun 2022 06:41  --------------------------------------------------------  IN:    IV PiggyBack: 237.5 mL    Lactated Ringers: 2380 mL  Total IN: 2617.5 mL    OUT:    Voided (mL): 950 mL  Total OUT: 950 mL    Total NET: 1667.5 mL          LABS:                        12.2   8.42  )-----------( 211      ( 18 Jun 2022 05:47 )             38.1     06-18    138  |  103  |  10  ----------------------------<  147<H>  4.8   |  23  |  0.91    Ca    9.6      18 Jun 2022 05:47  Phos  3.3     06-18  Mg     2.2     06-18            RADIOLOGY & ADDITIONAL STUDIES:   SUBJECTIVE: Pt seen and examined at bedside this am by surgery team. Has been tolerating more liquids. Still complaining of "tight" sensation in her throat but otherwise doing well.  Denies nausea or vomiting    MEDICATIONS  (STANDING):  acetaminophen    Suspension .. 650 milliGRAM(s) Oral every 6 hours  atorvastatin 20 milliGRAM(s) Oral at bedtime  dexAMETHasone  Injectable 6 milliGRAM(s) IV Push every 8 hours  hyoscyamine SL 0.125 milliGRAM(s) SubLingual every 6 hours  lactated ringers. 1000 milliLiter(s) (140 mL/Hr) IV Continuous <Continuous>  pantoprazole  Injectable 40 milliGRAM(s) IV Push daily    MEDICATIONS  (PRN):  ALBUTerol    90 MICROgram(s) HFA Inhaler 2 Puff(s) Inhalation every 6 hours PRN Shortness of Breath and/or Wheezing  benzocaine 15 mG/menthol 3.6 mG Lozenge 1 Lozenge Oral four times a day PRN Sore Throat  ketorolac   Injectable 15 milliGRAM(s) IV Push every 6 hours PRN Moderate Pain (4 - 6)  ondansetron Injectable 4 milliGRAM(s) IV Push every 6 hours PRN Nausea and/or Vomiting      Vital Signs Last 24 Hrs  T(C): 36.7 (18 Jun 2022 05:40), Max: 36.8 (18 Jun 2022 00:49)  T(F): 98.1 (18 Jun 2022 05:40), Max: 98.2 (18 Jun 2022 00:49)  HR: 62 (18 Jun 2022 05:40) (62 - 89)  BP: 172/99 (18 Jun 2022 05:40) (134/99 - 172/99)  BP(mean): --  RR: 18 (18 Jun 2022 05:40) (17 - 19)  SpO2: 97% (18 Jun 2022 05:40) (94% - 100%)    Physical Exam  General: NAD, resting comfortably in bed  Pulmonary: Nonlabored breathing, no respiratory distress  CV: NSR  Abd: soft, NT/ND, no guarding, incisions c/d/i  Extremities: (-) edema, warm, well-perfused      I&O's Detail    16 Jun 2022 07:01  -  17 Jun 2022 07:00  --------------------------------------------------------  IN:    Lactated Ringers: 840 mL  Total IN: 840 mL    OUT:    Voided (mL): 575 mL  Total OUT: 575 mL    Total NET: 265 mL      17 Jun 2022 07:01  -  18 Jun 2022 06:41  --------------------------------------------------------  IN:    IV PiggyBack: 237.5 mL    Lactated Ringers: 2380 mL  Total IN: 2617.5 mL    OUT:    Voided (mL): 950 mL  Total OUT: 950 mL    Total NET: 1667.5 mL          LABS:                        12.2   8.42  )-----------( 211      ( 18 Jun 2022 05:47 )             38.1     06-18    138  |  103  |  10  ----------------------------<  147<H>  4.8   |  23  |  0.91    Ca    9.6      18 Jun 2022 05:47  Phos  3.3     06-18  Mg     2.2     06-18            RADIOLOGY & ADDITIONAL STUDIES:

## 2022-06-18 NOTE — DISCHARGE NOTE NURSING/CASE MANAGEMENT/SOCIAL WORK - NSDCPEFALRISK_GEN_ALL_CORE
For information on Fall & Injury Prevention, visit: [email or web address] OR  [email or web address] OR  https://www.cdc.gov/steadi/patient.html

## 2022-06-18 NOTE — PROGRESS NOTE ADULT - ASSESSMENT
67 y/o F pmh MO ( BMI 38), HEATHER, HTN, HLD, GERD, IBS Asthma, presents for Laparoscopic Sleeve Gastrectomy. If tolerating enough liquids today will be ready to go home with outpatient follow-up.    -Pain/nausea control   -BCLD, IVF   -Protonix   -SCDs, OOB/A, IS   - HSQ  -AM labs

## 2022-06-23 DIAGNOSIS — K44.9 DIAPHRAGMATIC HERNIA WITHOUT OBSTRUCTION OR GANGRENE: ICD-10-CM

## 2022-06-23 DIAGNOSIS — G47.33 OBSTRUCTIVE SLEEP APNEA (ADULT) (PEDIATRIC): ICD-10-CM

## 2022-06-23 DIAGNOSIS — Z79.51 LONG TERM (CURRENT) USE OF INHALED STEROIDS: ICD-10-CM

## 2022-06-23 DIAGNOSIS — Z79.02 LONG TERM (CURRENT) USE OF ANTITHROMBOTICS/ANTIPLATELETS: ICD-10-CM

## 2022-06-23 DIAGNOSIS — E66.01 MORBID (SEVERE) OBESITY DUE TO EXCESS CALORIES: ICD-10-CM

## 2022-06-23 DIAGNOSIS — Z91.013 ALLERGY TO SEAFOOD: ICD-10-CM

## 2022-06-23 DIAGNOSIS — Z88.5 ALLERGY STATUS TO NARCOTIC AGENT: ICD-10-CM

## 2022-06-23 DIAGNOSIS — I10 ESSENTIAL (PRIMARY) HYPERTENSION: ICD-10-CM

## 2022-06-23 DIAGNOSIS — E78.5 HYPERLIPIDEMIA, UNSPECIFIED: ICD-10-CM

## 2022-06-23 DIAGNOSIS — Z88.6 ALLERGY STATUS TO ANALGESIC AGENT: ICD-10-CM

## 2022-06-23 DIAGNOSIS — R73.03 PREDIABETES: ICD-10-CM

## 2022-06-23 DIAGNOSIS — J45.909 UNSPECIFIED ASTHMA, UNCOMPLICATED: ICD-10-CM

## 2022-06-23 DIAGNOSIS — K21.9 GASTRO-ESOPHAGEAL REFLUX DISEASE WITHOUT ESOPHAGITIS: ICD-10-CM

## 2022-06-23 DIAGNOSIS — F41.9 ANXIETY DISORDER, UNSPECIFIED: ICD-10-CM

## 2022-06-23 DIAGNOSIS — K58.9 IRRITABLE BOWEL SYNDROME WITHOUT DIARRHEA: ICD-10-CM

## 2022-06-24 LAB — SURGICAL PATHOLOGY STUDY: SIGNIFICANT CHANGE UP
